# Patient Record
Sex: MALE | Race: WHITE | NOT HISPANIC OR LATINO | URBAN - METROPOLITAN AREA
[De-identification: names, ages, dates, MRNs, and addresses within clinical notes are randomized per-mention and may not be internally consistent; named-entity substitution may affect disease eponyms.]

---

## 2018-01-21 ENCOUNTER — INPATIENT (INPATIENT)
Facility: HOSPITAL | Age: 38
LOS: 0 days | Discharge: HOME | DRG: 872 | End: 2018-01-22
Attending: INTERNAL MEDICINE | Admitting: INTERNAL MEDICINE
Payer: COMMERCIAL

## 2018-01-21 VITALS
DIASTOLIC BLOOD PRESSURE: 73 MMHG | TEMPERATURE: 99 F | SYSTOLIC BLOOD PRESSURE: 125 MMHG | OXYGEN SATURATION: 94 % | HEART RATE: 117 BPM | HEIGHT: 75 IN | RESPIRATION RATE: 20 BRPM | WEIGHT: 229.94 LBS

## 2018-01-21 DIAGNOSIS — A41.9 SEPSIS, UNSPECIFIED ORGANISM: ICD-10-CM

## 2018-01-21 DIAGNOSIS — J10.1 INFLUENZA DUE TO OTHER IDENTIFIED INFLUENZA VIRUS WITH OTHER RESPIRATORY MANIFESTATIONS: ICD-10-CM

## 2018-01-21 DIAGNOSIS — J67.9 HYPERSENSITIVITY PNEUMONITIS DUE TO UNSPECIFIED ORGANIC DUST: ICD-10-CM

## 2018-01-21 DIAGNOSIS — Z29.9 ENCOUNTER FOR PROPHYLACTIC MEASURES, UNSPECIFIED: ICD-10-CM

## 2018-01-21 DIAGNOSIS — Z98.890 OTHER SPECIFIED POSTPROCEDURAL STATES: Chronic | ICD-10-CM

## 2018-01-21 LAB
ANION GAP SERPL CALC-SCNC: 13 MMOL/L — SIGNIFICANT CHANGE UP (ref 5–17)
BASOPHILS NFR BLD AUTO: 0.2 % — SIGNIFICANT CHANGE UP (ref 0–2)
BUN SERPL-MCNC: 13 MG/DL — SIGNIFICANT CHANGE UP (ref 7–23)
CALCIUM SERPL-MCNC: 9.6 MG/DL — SIGNIFICANT CHANGE UP (ref 8.4–10.5)
CHLORIDE SERPL-SCNC: 100 MMOL/L — SIGNIFICANT CHANGE UP (ref 96–108)
CO2 SERPL-SCNC: 25 MMOL/L — SIGNIFICANT CHANGE UP (ref 22–31)
CREAT SERPL-MCNC: 1.18 MG/DL — SIGNIFICANT CHANGE UP (ref 0.5–1.3)
EOSINOPHIL NFR BLD AUTO: 0.3 % — SIGNIFICANT CHANGE UP (ref 0–6)
GLUCOSE SERPL-MCNC: 104 MG/DL — HIGH (ref 70–99)
HCT VFR BLD CALC: 48.3 % — SIGNIFICANT CHANGE UP (ref 39–50)
HGB BLD-MCNC: 16.3 G/DL — SIGNIFICANT CHANGE UP (ref 13–17)
LYMPHOCYTES # BLD AUTO: 12.1 % — LOW (ref 13–44)
MCHC RBC-ENTMCNC: 28.8 PG — SIGNIFICANT CHANGE UP (ref 27–34)
MCHC RBC-ENTMCNC: 33.7 G/DL — SIGNIFICANT CHANGE UP (ref 32–36)
MCV RBC AUTO: 85.3 FL — SIGNIFICANT CHANGE UP (ref 80–100)
MONOCYTES NFR BLD AUTO: 13.7 % — SIGNIFICANT CHANGE UP (ref 2–14)
NEUTROPHILS NFR BLD AUTO: 73.7 % — SIGNIFICANT CHANGE UP (ref 43–77)
PLATELET # BLD AUTO: 200 K/UL — SIGNIFICANT CHANGE UP (ref 150–400)
POTASSIUM SERPL-MCNC: 4.5 MMOL/L — SIGNIFICANT CHANGE UP (ref 3.5–5.3)
POTASSIUM SERPL-SCNC: 4.5 MMOL/L — SIGNIFICANT CHANGE UP (ref 3.5–5.3)
RBC # BLD: 5.66 M/UL — SIGNIFICANT CHANGE UP (ref 4.2–5.8)
RBC # FLD: 12.6 % — SIGNIFICANT CHANGE UP (ref 10.3–16.9)
SODIUM SERPL-SCNC: 138 MMOL/L — SIGNIFICANT CHANGE UP (ref 135–145)
WBC # BLD: 8.7 K/UL — SIGNIFICANT CHANGE UP (ref 3.8–10.5)
WBC # FLD AUTO: 8.7 K/UL — SIGNIFICANT CHANGE UP (ref 3.8–10.5)

## 2018-01-21 PROCEDURE — 71046 X-RAY EXAM CHEST 2 VIEWS: CPT | Mod: 26

## 2018-01-21 PROCEDURE — 93010 ELECTROCARDIOGRAM REPORT: CPT

## 2018-01-21 PROCEDURE — 99223 1ST HOSP IP/OBS HIGH 75: CPT | Mod: GC

## 2018-01-21 PROCEDURE — 99285 EMERGENCY DEPT VISIT HI MDM: CPT | Mod: 25

## 2018-01-21 RX ORDER — CEFTRIAXONE 500 MG/1
1 INJECTION, POWDER, FOR SOLUTION INTRAMUSCULAR; INTRAVENOUS ONCE
Qty: 0 | Refills: 0 | Status: COMPLETED | OUTPATIENT
Start: 2018-01-21 | End: 2018-01-21

## 2018-01-21 RX ORDER — SODIUM CHLORIDE 9 MG/ML
250 INJECTION INTRAMUSCULAR; INTRAVENOUS; SUBCUTANEOUS ONCE
Qty: 0 | Refills: 0 | Status: COMPLETED | OUTPATIENT
Start: 2018-01-21 | End: 2018-01-21

## 2018-01-21 RX ORDER — SODIUM CHLORIDE 9 MG/ML
1000 INJECTION INTRAMUSCULAR; INTRAVENOUS; SUBCUTANEOUS
Qty: 0 | Refills: 0 | Status: DISCONTINUED | OUTPATIENT
Start: 2018-01-21 | End: 2018-01-22

## 2018-01-21 RX ORDER — ALBUTEROL 90 UG/1
2.5 AEROSOL, METERED ORAL
Qty: 0 | Refills: 0 | Status: COMPLETED | OUTPATIENT
Start: 2018-01-21 | End: 2018-01-21

## 2018-01-21 RX ORDER — AZITHROMYCIN 500 MG/1
500 TABLET, FILM COATED ORAL ONCE
Qty: 0 | Refills: 0 | Status: DISCONTINUED | OUTPATIENT
Start: 2018-01-21 | End: 2018-01-21

## 2018-01-21 RX ORDER — SODIUM CHLORIDE 9 MG/ML
1000 INJECTION INTRAMUSCULAR; INTRAVENOUS; SUBCUTANEOUS ONCE
Qty: 0 | Refills: 0 | Status: COMPLETED | OUTPATIENT
Start: 2018-01-21 | End: 2018-01-21

## 2018-01-21 RX ORDER — IPRATROPIUM/ALBUTEROL SULFATE 18-103MCG
3 AEROSOL WITH ADAPTER (GRAM) INHALATION EVERY 6 HOURS
Qty: 0 | Refills: 0 | Status: DISCONTINUED | OUTPATIENT
Start: 2018-01-21 | End: 2018-01-22

## 2018-01-21 RX ADMIN — Medication 200 MILLIGRAM(S): at 22:33

## 2018-01-21 RX ADMIN — SODIUM CHLORIDE 2000 MILLILITER(S): 9 INJECTION INTRAMUSCULAR; INTRAVENOUS; SUBCUTANEOUS at 18:26

## 2018-01-21 RX ADMIN — Medication 3 MILLILITER(S): at 21:19

## 2018-01-21 RX ADMIN — SODIUM CHLORIDE 125 MILLILITER(S): 9 INJECTION INTRAMUSCULAR; INTRAVENOUS; SUBCUTANEOUS at 19:04

## 2018-01-21 RX ADMIN — CEFTRIAXONE 100 GRAM(S): 500 INJECTION, POWDER, FOR SOLUTION INTRAMUSCULAR; INTRAVENOUS at 20:24

## 2018-01-21 RX ADMIN — SODIUM CHLORIDE 125 MILLILITER(S): 9 INJECTION INTRAMUSCULAR; INTRAVENOUS; SUBCUTANEOUS at 22:33

## 2018-01-21 RX ADMIN — ALBUTEROL 2.5 MILLIGRAM(S): 90 AEROSOL, METERED ORAL at 18:50

## 2018-01-21 RX ADMIN — SODIUM CHLORIDE 2000 MILLILITER(S): 9 INJECTION INTRAMUSCULAR; INTRAVENOUS; SUBCUTANEOUS at 17:03

## 2018-01-21 RX ADMIN — Medication 75 MILLIGRAM(S): at 17:03

## 2018-01-21 RX ADMIN — ALBUTEROL 2.5 MILLIGRAM(S): 90 AEROSOL, METERED ORAL at 17:03

## 2018-01-21 RX ADMIN — ALBUTEROL 2.5 MILLIGRAM(S): 90 AEROSOL, METERED ORAL at 18:26

## 2018-01-21 RX ADMIN — SODIUM CHLORIDE 500 MILLILITER(S): 9 INJECTION INTRAMUSCULAR; INTRAVENOUS; SUBCUTANEOUS at 18:26

## 2018-01-21 RX ADMIN — Medication 60 MILLIGRAM(S): at 17:03

## 2018-01-21 NOTE — H&P ADULT - NSHPLABSRESULTS_GEN_ALL_CORE
.  LABS:                         16.3   8.7   )-----------( 200      ( 21 Jan 2018 16:59 )             48.3     01-21    138  |  100  |  13  ----------------------------<  104<H>  4.5   |  25  |  1.18    Ca    9.6      21 Jan 2018 16:59                    RADIOLOGY, EKG & ADDITIONAL TESTS: Reviewed.

## 2018-01-21 NOTE — H&P ADULT - PROBLEM SELECTOR PLAN 3
Diagnosed in 2005 via biopsy; was on prednisone for 3 years- stopped in 2009- reports worsening of symptoms since december  -- obtain collateral from Outpatient Provider, Dr Ricky Oconnell- allergist regarding CT scan performed outpatient  -- duonebs PRN  -- patient has no pulmonologist- would benefit from outpatient

## 2018-01-21 NOTE — H&P ADULT - NSHPSOCIALHISTORY_GEN_ALL_CORE
Patient works in construction; Patient does not smoke tobacco, denies any drug use or marijuana; drinks alcohol socially

## 2018-01-21 NOTE — H&P ADULT - PROBLEM SELECTOR PLAN 4
F: will continue maintenance fluids for now  E: replete electrolytes PRN  N: Regular diet  Prophylactic measure: none  FULL CODE  ADMIT to medicine for IVF and O2 supplementation

## 2018-01-21 NOTE — H&P ADULT - PROBLEM SELECTOR PLAN 1
Patient with flu-like symptoms Patient with flu-like symptoms for 3 days; RVP postive for influenza A as outpatient; Me Patient with flu-like symptoms for 3 days; RVP positive for influenza A as outpatient; Currently meets SIRS criteria with tachycardia and tachypnea   -- continue tamiflu 75 BID  -- continue maintenance fluids  --O2 supplementation  -- Duoneb PRN   -- Robitussin for cough

## 2018-01-21 NOTE — ED PROVIDER NOTE - ENMT, MLM
Airway patent, Nasal mucosa clear. Mouth with normal mucosa. Throat has no vesicles, no oropharyngeal exudates and uvula is midline. + nasal congestion

## 2018-01-21 NOTE — H&P ADULT - NSHPPHYSICALEXAM_GEN_ALL_CORE
.  VITAL SIGNS:  T(C): 37.4 (01-21-18 @ 16:29), Max: 37.4 (01-21-18 @ 16:29)  T(F): 99.3 (01-21-18 @ 16:29), Max: 99.3 (01-21-18 @ 16:29)  HR: 117 (01-21-18 @ 16:29) (117 - 117)  BP: 125/73 (01-21-18 @ 16:29) (125/73 - 125/73)  BP(mean): --  RR: 20 (01-21-18 @ 16:29) (20 - 20)  SpO2: 94% (01-21-18 @ 16:29) (94% - 94%)  Wt(kg): --    PHYSICAL EXAM:    Constitutional: Patient seems diaphoretic with glistening forehead   Head: NC/AT  Eyes: R corneal haze (traumatic from previous injury), EOMI, clear conjunctiva  ENT: +mild nasal discharge; uvula midline, no oropharyngeal erythema or exudates; dry MM   Neck: supple; no JVD or thyromegaly  Respiratory: CTA B/L with some decreased BS throughout; no W/R/R, no retractions  Cardiac: +S1/S2- mildly tachycardic ; regular rhythm; no M/R/G;   Gastrointestinal: soft, NT/ND; no rebound or guarding; +BS  Back: spine midline, no bony tenderness or step-offs;  Extremities: WWP, no clubbing or cyanosis; no peripheral edema  Musculoskeletal: NROM x4;   Vascular: 2+ radial and DP pulses B/L  Lymphatic: no submandibular or cervical LAD  Neurologic: AAOx3; CNII-XII grossly intact; no focal deficits  Psychiatric: affect and characteristics of appearance, verbalizations, behaviors are appropriate

## 2018-01-21 NOTE — H&P ADULT - HISTORY OF PRESENT ILLNESS
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 Patient reports his hypersensitvity symptoms (SOB with exertion and a non-productive cough) have been in remission up until december. His symptoms maily include dyspnea with exertion and he has noticed he has a very diffcult time walking even a flight of stairs. He went to see his allergist this past wednesday (Dr Ricky Oconnell) and a CT scan of lungs and bloodwork was performed (results not available yet). Patient then developed flu-like symptoms at home that include cough with intermittent production of clear sputum, myalgia, fatigue, loss of appetite and nausea. Patient measure his fever 2 days ago and it was 101.  Patient also had 1 episode of NBNB vomiting after drinking water today. Patient went to [facility] MD and rapid flu was positive for influenza A and he was sent here for evaluation.     ROS is also postiive for a headache 2 days ago. Currently denies chills, CP, abdominal pain, diarrhea constipation or change in his bowel habits.     in the ED, vitals were T(F): 99.3 HR: 117 BP: 125/73 RR: 20 SpO2: 94%. As per Ed attending, patient was hypoxic to 88% after ambulating in the ED. Patient received 3.25 L of NS, tamiflu, ceftriaxone, and albuterol.

## 2018-01-21 NOTE — H&P ADULT - PROBLEM SELECTOR PLAN 2
Meets SIRS criteria 2/4 with tachycardia and tachypnea with RVP positive for influenza  -- see plans above

## 2018-01-21 NOTE — ED PROVIDER NOTE - OBJECTIVE STATEMENT
36 yo male h/o traumatic R eye injury, hypersensitivity pneumonitis c/o worsening pulm sx lately - recently saw an allergist and had ct chest Fri w/o known result or plan at this time but new onset of fever, cough, worsening sob Fri afternoon.  Pt went to outpt clinic - found to have flu A and given neb for sat 91% w improvement to 96% on ra.  Pt notes rhinorrhea, myalgias, fever 101 (last pm, no fever today), cough w yellow sputum, sob.  No abd pain, n/v/d, sick contacts, travel.  + pleuritic sscp w cough o/w no cp.

## 2018-01-21 NOTE — ED PROVIDER NOTE - PROGRESS NOTE DETAILS
Pt feeling somewhat better after neb; less wheezing on exam.  CXR abnl but ? 2/2 his pneumonitis.  Pt w sat 88% after ambulating in ed.  Given o2 and will admit for rx.

## 2018-01-21 NOTE — ED PROVIDER NOTE - MEDICAL DECISION MAKING DETAILS
Pt w h/o hypersensitivity pneumonitis now w flu a and increased sob. + hypoxia before neb at clinic, nl sat here but scattered wheezing.  Will check labs, cxr, give ivf, tamiflu, reassess.

## 2018-01-21 NOTE — ED PROVIDER NOTE - CONSTITUTIONAL, MLM
normal... mildly appearing, well nourished, awake, alert, oriented to person, place, time/situation and in no resp distress.

## 2018-01-21 NOTE — ED PROVIDER NOTE - DIAGNOSTIC INTERPRETATION
ER Physician:  Marcela Director  CHEST XRAY INTERPRETATION: lungs diffuse bilat reticulnodular pattern, no infiltrate, heart shadow normal, bony structures intact

## 2018-01-21 NOTE — H&P ADULT - ASSESSMENT
Olimpia is a 38 yo male with a PMHX of Hypersensitivity Pneumonitis (dx in 2005 via biopsy) and traumatic injury of right eye who presents with SOB with flu-like symptoms for the past 3 days

## 2018-01-22 ENCOUNTER — TRANSCRIPTION ENCOUNTER (OUTPATIENT)
Age: 38
End: 2018-01-22

## 2018-01-22 VITALS
DIASTOLIC BLOOD PRESSURE: 80 MMHG | RESPIRATION RATE: 2 BRPM | HEART RATE: 79 BPM | OXYGEN SATURATION: 93 % | TEMPERATURE: 98 F | SYSTOLIC BLOOD PRESSURE: 131 MMHG

## 2018-01-22 LAB
ALBUMIN SERPL ELPH-MCNC: 3.6 G/DL — SIGNIFICANT CHANGE UP (ref 3.3–5)
ALP SERPL-CCNC: 45 U/L — SIGNIFICANT CHANGE UP (ref 40–120)
ALT FLD-CCNC: 21 U/L — SIGNIFICANT CHANGE UP (ref 10–45)
ANION GAP SERPL CALC-SCNC: 13 MMOL/L — SIGNIFICANT CHANGE UP (ref 5–17)
AST SERPL-CCNC: 23 U/L — SIGNIFICANT CHANGE UP (ref 10–40)
BILIRUB SERPL-MCNC: 0.5 MG/DL — SIGNIFICANT CHANGE UP (ref 0.2–1.2)
BUN SERPL-MCNC: 10 MG/DL — SIGNIFICANT CHANGE UP (ref 7–23)
CALCIUM SERPL-MCNC: 8.7 MG/DL — SIGNIFICANT CHANGE UP (ref 8.4–10.5)
CHLORIDE SERPL-SCNC: 108 MMOL/L — SIGNIFICANT CHANGE UP (ref 96–108)
CHOLEST SERPL-MCNC: 123 MG/DL — SIGNIFICANT CHANGE UP (ref 10–199)
CO2 SERPL-SCNC: 21 MMOL/L — LOW (ref 22–31)
CREAT SERPL-MCNC: 0.78 MG/DL — SIGNIFICANT CHANGE UP (ref 0.5–1.3)
GLUCOSE SERPL-MCNC: 120 MG/DL — HIGH (ref 70–99)
HBA1C BLD-MCNC: 5.5 % — SIGNIFICANT CHANGE UP (ref 4–5.6)
HCT VFR BLD CALC: 42.5 % — SIGNIFICANT CHANGE UP (ref 39–50)
HDLC SERPL-MCNC: 29 MG/DL — LOW (ref 40–125)
HGB BLD-MCNC: 14.2 G/DL — SIGNIFICANT CHANGE UP (ref 13–17)
LIPID PNL WITH DIRECT LDL SERPL: 73 MG/DL — SIGNIFICANT CHANGE UP
MAGNESIUM SERPL-MCNC: 2.1 MG/DL — SIGNIFICANT CHANGE UP (ref 1.6–2.6)
MCHC RBC-ENTMCNC: 28.7 PG — SIGNIFICANT CHANGE UP (ref 27–34)
MCHC RBC-ENTMCNC: 33.4 G/DL — SIGNIFICANT CHANGE UP (ref 32–36)
MCV RBC AUTO: 86 FL — SIGNIFICANT CHANGE UP (ref 80–100)
PHOSPHATE SERPL-MCNC: 4.5 MG/DL — SIGNIFICANT CHANGE UP (ref 2.5–4.5)
PLATELET # BLD AUTO: 161 K/UL — SIGNIFICANT CHANGE UP (ref 150–400)
POTASSIUM SERPL-MCNC: 4.4 MMOL/L — SIGNIFICANT CHANGE UP (ref 3.5–5.3)
POTASSIUM SERPL-SCNC: 4.4 MMOL/L — SIGNIFICANT CHANGE UP (ref 3.5–5.3)
PROT SERPL-MCNC: 7 G/DL — SIGNIFICANT CHANGE UP (ref 6–8.3)
RBC # BLD: 4.94 M/UL — SIGNIFICANT CHANGE UP (ref 4.2–5.8)
RBC # FLD: 12.3 % — SIGNIFICANT CHANGE UP (ref 10.3–16.9)
SODIUM SERPL-SCNC: 142 MMOL/L — SIGNIFICANT CHANGE UP (ref 135–145)
TOTAL CHOLESTEROL/HDL RATIO MEASUREMENT: 4.2 RATIO — SIGNIFICANT CHANGE UP (ref 3.4–9.6)
TRIGL SERPL-MCNC: 105 MG/DL — SIGNIFICANT CHANGE UP (ref 10–149)
WBC # BLD: 5.7 K/UL — SIGNIFICANT CHANGE UP (ref 3.8–10.5)
WBC # FLD AUTO: 5.7 K/UL — SIGNIFICANT CHANGE UP (ref 3.8–10.5)

## 2018-01-22 PROCEDURE — 99285 EMERGENCY DEPT VISIT HI MDM: CPT | Mod: 25

## 2018-01-22 PROCEDURE — 84100 ASSAY OF PHOSPHORUS: CPT

## 2018-01-22 PROCEDURE — 80048 BASIC METABOLIC PNL TOTAL CA: CPT

## 2018-01-22 PROCEDURE — 36415 COLL VENOUS BLD VENIPUNCTURE: CPT

## 2018-01-22 PROCEDURE — 94640 AIRWAY INHALATION TREATMENT: CPT

## 2018-01-22 PROCEDURE — 80061 LIPID PANEL: CPT

## 2018-01-22 PROCEDURE — 85027 COMPLETE CBC AUTOMATED: CPT

## 2018-01-22 PROCEDURE — 99239 HOSP IP/OBS DSCHRG MGMT >30: CPT

## 2018-01-22 PROCEDURE — 83036 HEMOGLOBIN GLYCOSYLATED A1C: CPT

## 2018-01-22 PROCEDURE — 93005 ELECTROCARDIOGRAM TRACING: CPT

## 2018-01-22 PROCEDURE — 85025 COMPLETE CBC W/AUTO DIFF WBC: CPT

## 2018-01-22 PROCEDURE — 80053 COMPREHEN METABOLIC PANEL: CPT

## 2018-01-22 PROCEDURE — 83735 ASSAY OF MAGNESIUM: CPT

## 2018-01-22 PROCEDURE — 71046 X-RAY EXAM CHEST 2 VIEWS: CPT

## 2018-01-22 RX ADMIN — SODIUM CHLORIDE 125 MILLILITER(S): 9 INJECTION INTRAMUSCULAR; INTRAVENOUS; SUBCUTANEOUS at 08:05

## 2018-01-22 RX ADMIN — Medication 75 MILLIGRAM(S): at 08:05

## 2018-01-22 NOTE — DISCHARGE NOTE ADULT - HOSPITAL COURSE
36 yo male with a PMHX of Hypersensitivity Pneumonitis (dx in 2006 via biopsy, Prednisone until 2009, asx up until Dec 2017) and traumatic injury of right eye presented to [facility] on 1/21 with complaints of SOB, intermittent productive cough since 1/19. Of note, pt saw his allergist this past Wednesday (Dr Ricky Oconnell) and a CT scan of lungs which revealed b/l centrilobular ground glass opacities. Patient went to [facility] MD on 1/21, tested positive for influenza A and was sent directly to the ED for further evaluation. In the ED, vitals were T(F): 99.3 HR: 117 BP: 125/73 RR: 20 SpO2: 94%. Per the ED, patient hypoxic to 88% with ambulation. Patient received 3.25 L of NS, tamiflu, ceftriaxone, and albuterol in the ED. Patient initiated on Tamilfu 75 mg BID. Patient on 1L NC on the floors. Taken off NC on 1/22, saturating well off of it while ambulating. Patient HD stable and ready for discharge home. Patient to schedule an appointment and follow up with Dr Pino (Pulmonology) in the outpatient setting for further medical management of his hypersensitivity pneumonitis.

## 2018-01-22 NOTE — DISCHARGE NOTE ADULT - MEDICATION SUMMARY - MEDICATIONS TO TAKE
I will START or STAY ON the medications listed below when I get home from the hospital:    Tamiflu 75 mg oral capsule  -- 1 cap(s) by mouth every 12 hours  -- Indication: For Influenza A

## 2018-01-22 NOTE — DISCHARGE NOTE ADULT - CARE PLAN
Principal Discharge DX:	Influenza A  Goal:	To resolve your infection  Assessment and plan of treatment:	During this admission, you were treated for the flu. For this, you were started on an anti-viral medication, called Tamiflu for which you will need to continue upon leaving the hospital. A prescription of this medication has been sent to your pharmacy. Please take this medication as indicated.  Secondary Diagnosis:	Hypersensitivity pneumonitis  Assessment and plan of treatment:	You have a known history of hypersensitivity pneumonitis for which you previously took steroids for in the past. Please follow up with a pulmonologist (Dr Pino, contact info listed below) for further medical management of this issue.

## 2018-01-22 NOTE — DISCHARGE NOTE ADULT - PLAN OF CARE
To resolve your infection During this admission, you were treated for the flu. For this, you were started on an anti-viral medication, called Tamiflu for which you will need to continue upon leaving the hospital. A prescription of this medication has been sent to your pharmacy. Please take this medication as indicated. You have a known history of hypersensitivity pneumonitis for which you previously took steroids for in the past. Please follow up with a pulmonologist (Dr Pino, contact info listed below) for further medical management of this issue.

## 2018-01-22 NOTE — DISCHARGE NOTE ADULT - CARE PROVIDER_API CALL
Laurie Pino), Critical Care Medicine; Pulmonary Disease  [address], [place] [address]  Phone: [phone]  Fax: [phone]

## 2018-01-23 PROBLEM — Z00.00 ENCOUNTER FOR PREVENTIVE HEALTH EXAMINATION: Status: ACTIVE | Noted: 2018-01-23

## 2018-01-24 DIAGNOSIS — H18.891 OTHER SPECIFIED DISORDERS OF CORNEA, RIGHT EYE: ICD-10-CM

## 2018-01-24 DIAGNOSIS — J10.1 INFLUENZA DUE TO OTHER IDENTIFIED INFLUENZA VIRUS WITH OTHER RESPIRATORY MANIFESTATIONS: ICD-10-CM

## 2018-01-24 DIAGNOSIS — J67.9 HYPERSENSITIVITY PNEUMONITIS DUE TO UNSPECIFIED ORGANIC DUST: ICD-10-CM

## 2018-01-24 DIAGNOSIS — A41.89 OTHER SPECIFIED SEPSIS: ICD-10-CM

## 2018-03-29 ENCOUNTER — APPOINTMENT (OUTPATIENT)
Dept: PULMONOLOGY | Facility: CLINIC | Age: 38
End: 2018-03-29

## 2019-02-21 NOTE — ED ADULT NURSE NOTE - DISCHARGE DATE/TIME
21-Jan-2018 21:25 Consent (Marginal Mandibular)/Introductory Paragraph: The rationale for Mohs was explained to the patient and consent was obtained. The risks, benefits and alternatives to therapy were discussed in detail. Specifically, the risks of damage to the marginal mandibular branch of the facial nerve, infection, scarring, bleeding, prolonged wound healing, incomplete removal, allergy to anesthesia, and recurrence were addressed. Prior to the procedure, the treatment site was clearly identified and confirmed by the patient. All components of Universal Protocol/PAUSE Rule completed.

## 2020-12-19 ENCOUNTER — TRANSCRIPTION ENCOUNTER (OUTPATIENT)
Age: 40
End: 2020-12-19

## 2021-02-06 NOTE — DISCHARGE NOTE ADULT - PATIENT PORTAL LINK FT
5 “You can access the [employer] Patient Portal, offered by [facility], by registering with the following website: [email or web address]”

## 2023-11-21 NOTE — DISCHARGE NOTE ADULT - FUNCTIONAL SCREEN CURRENT LEVEL: AMBULATION, MLM
Patient has DM FUV 11/28/23 with Vanita Avalos. Pre-op exam would need to be separate appointment. No available openings. Please call patient to schedule Pre-op with Dr Amadou Pantoja prior to procedure. (0) independent